# Patient Record
Sex: MALE | Race: WHITE | NOT HISPANIC OR LATINO | Employment: UNEMPLOYED | URBAN - NONMETROPOLITAN AREA
[De-identification: names, ages, dates, MRNs, and addresses within clinical notes are randomized per-mention and may not be internally consistent; named-entity substitution may affect disease eponyms.]

---

## 2017-01-31 ENCOUNTER — HOSPITAL ENCOUNTER (EMERGENCY)
Facility: HOSPITAL | Age: 22
Discharge: HOME | End: 2017-01-31
Attending: EMERGENCY MEDICINE | Admitting: EMERGENCY MEDICINE

## 2017-01-31 ENCOUNTER — APPOINTMENT (OUTPATIENT)
Dept: GENERAL RADIOLOGY | Facility: HOSPITAL | Age: 22
End: 2017-01-31

## 2017-01-31 VITALS
DIASTOLIC BLOOD PRESSURE: 80 MMHG | HEIGHT: 67 IN | WEIGHT: 127 LBS | SYSTOLIC BLOOD PRESSURE: 131 MMHG | TEMPERATURE: 98.2 F | BODY MASS INDEX: 19.93 KG/M2 | RESPIRATION RATE: 16 BRPM | OXYGEN SATURATION: 99 % | HEART RATE: 88 BPM

## 2017-01-31 DIAGNOSIS — S01.81XA LACERATION OF FACE, INITIAL ENCOUNTER: Primary | ICD-10-CM

## 2017-01-31 PROCEDURE — 73120 X-RAY EXAM OF HAND: CPT

## 2017-01-31 PROCEDURE — 99283 EMERGENCY DEPT VISIT LOW MDM: CPT

## 2017-01-31 PROCEDURE — 73120 X-RAY EXAM OF HAND: CPT | Performed by: RADIOLOGY

## 2017-01-31 RX ORDER — LIDOCAINE HYDROCHLORIDE 10 MG/ML
20 INJECTION, SOLUTION EPIDURAL; INFILTRATION; INTRACAUDAL; PERINEURAL ONCE
Status: COMPLETED | OUTPATIENT
Start: 2017-01-31 | End: 2017-01-31

## 2017-01-31 RX ORDER — LIDOCAINE HYDROCHLORIDE 10 MG/ML
INJECTION, SOLUTION INFILTRATION; PERINEURAL
Status: COMPLETED
Start: 2017-01-31 | End: 2017-01-31

## 2017-01-31 RX ADMIN — LIDOCAINE HYDROCHLORIDE 20 ML: 10 INJECTION, SOLUTION EPIDURAL; INFILTRATION; INTRACAUDAL; PERINEURAL at 21:59

## 2017-01-31 RX ADMIN — LIDOCAINE HYDROCHLORIDE 20 ML: 10 INJECTION, SOLUTION INFILTRATION; PERINEURAL at 21:59

## 2017-02-01 NOTE — ED PROVIDER NOTES
Subjective   Patient is a 21 y.o. male presenting with fall.   History provided by:  Patient  Fall   Injury location:  Head/neck and shoulder/arm  Shoulder/arm injury location:  R hand  Incident location:  [facility] facility (fell out of bunk at care home)  Time since incident:  2 hours  Arrived directly from scene: yes    Prior to arrival data:     Blood loss:  Minimal  Associated symptoms: no abdominal pain and no chest pain        Review of Systems   Constitutional: Negative.  Negative for fever.   HENT: Negative.    Respiratory: Negative.    Cardiovascular: Negative.  Negative for chest pain.   Gastrointestinal: Negative.  Negative for abdominal pain.   Endocrine: Negative.    Genitourinary: Negative.  Negative for dysuria.   Skin: Negative.         Laceration right eyebrow   Neurological: Negative.    Psychiatric/Behavioral: Negative.    All other systems reviewed and are negative.      Past Medical History   Diagnosis Date   • Heart murmur    • Heart palpitations    • Hepatitis C carrier        No Known Allergies    Past Surgical History   Procedure Laterality Date   • Tracheal surgery         Family History   Problem Relation Age of Onset   • Drug abuse Maternal Aunt    • Drug abuse Paternal Aunt    • Drug abuse Maternal Uncle    • Drug abuse Paternal Uncle    • ADD / ADHD Neg Hx    • Alcohol abuse Neg Hx    • Anxiety disorder Neg Hx    • Bipolar disorder Neg Hx    • Dementia Neg Hx    • Depression Neg Hx    • OCD Neg Hx    • Paranoid behavior Neg Hx    • Schizophrenia Neg Hx    • Seizures Neg Hx    • Self-Injurious Behavior  Neg Hx    • Suicide Attempts Neg Hx        Social History     Social History   • Marital status: Single     Spouse name: N/A   • Number of children: N/A   • Years of education: N/A     Social History Main Topics   • Smoking status: Current Every Day Smoker     Packs/day: 1.00     Years: 10.00     Types: Cigarettes     Start date: 7/28/2006   • Smokeless tobacco: Current User     Types:  Snuff   • Alcohol use No   • Drug use: Yes     Special: Methamphetamines      Comment: suboxone   • Sexual activity: Defer     Other Topics Concern   • None     Social History Narrative           Objective   Physical Exam   Constitutional: He is oriented to person, place, and time. He appears well-developed and well-nourished. No distress.   HENT:   Head: Normocephalic and atraumatic.   Right Ear: External ear normal.   Left Ear: External ear normal.   Nose: Nose normal.   Eyes: Conjunctivae and EOM are normal. Pupils are equal, round, and reactive to light.   Neck: Normal range of motion. Neck supple. No JVD present. No tracheal deviation present.   Cardiovascular: Normal rate, regular rhythm and normal heart sounds.    No murmur heard.  Pulmonary/Chest: Effort normal and breath sounds normal. No respiratory distress. He has no wheezes.   Abdominal: Soft. Bowel sounds are normal. There is no tenderness.   Musculoskeletal: Normal range of motion. He exhibits no edema or deformity.   Pain right hand   Neurological: He is alert and oriented to person, place, and time. No cranial nerve deficit.   Skin: Skin is warm and dry. No rash noted. He is not diaphoretic. No erythema. No pallor.   Psychiatric: He has a normal mood and affect. His behavior is normal. Thought content normal.   Nursing note and vitals reviewed.      Laceration Repair  Date/Time: 1/31/2017 10:34 PM  Performed by: PRISCILA TODD.  Authorized by: PRISCILA TODD   Consent: Verbal consent obtained.  Consent given by: patient  Patient understanding: patient states understanding of the procedure being performed  Patient consent: the patient's understanding of the procedure matches consent given  Procedure consent: procedure consent matches procedure scheduled  Relevant documents: relevant documents present and verified  Test results: test results available and properly labeled  Patient identity confirmed: verbally with patient  Body area:  head/neck  Location details: right eyebrow  Laceration length: 2.5 cm  Tendon involvement: none  Nerve involvement: none  Vascular damage: no  Anesthesia: local infiltration    Anesthesia:  Anesthesia: local infiltration  Local Anesthetic: lidocaine 1% without epinephrine   Anesthetic total: 3 mL  Sedation:  Patient sedated: no    Irrigation solution: saline  Skin closure: 3-0 nylon  Number of sutures: 3  Technique: simple  Approximation: loose  Approximation difficulty: simple               ED Course  ED Course                  MDM    Final diagnoses:   None            Jef Dunaway MD  01/31/17 6281

## 2022-12-16 ENCOUNTER — APPOINTMENT (OUTPATIENT)
Dept: GENERAL RADIOLOGY | Facility: HOSPITAL | Age: 27
End: 2022-12-16

## 2022-12-16 ENCOUNTER — HOSPITAL ENCOUNTER (EMERGENCY)
Facility: HOSPITAL | Age: 27
Discharge: HOME | End: 2022-12-16
Attending: EMERGENCY MEDICINE | Admitting: EMERGENCY MEDICINE

## 2022-12-16 VITALS
OXYGEN SATURATION: 99 % | WEIGHT: 150 LBS | HEART RATE: 93 BPM | HEIGHT: 67 IN | SYSTOLIC BLOOD PRESSURE: 117 MMHG | TEMPERATURE: 98.1 F | DIASTOLIC BLOOD PRESSURE: 82 MMHG | RESPIRATION RATE: 20 BRPM | BODY MASS INDEX: 23.54 KG/M2

## 2022-12-16 DIAGNOSIS — R44.0 AUDITORY HALLUCINATIONS: ICD-10-CM

## 2022-12-16 DIAGNOSIS — F29 PSYCHOSIS, UNSPECIFIED PSYCHOSIS TYPE: ICD-10-CM

## 2022-12-16 DIAGNOSIS — F23 ACUTE PSYCHOSIS: Primary | ICD-10-CM

## 2022-12-16 LAB
ALBUMIN SERPL-MCNC: 4.3 G/DL (ref 3.5–5.2)
ALBUMIN/GLOB SERPL: 1.2 G/DL
ALP SERPL-CCNC: 58 U/L (ref 39–117)
ALT SERPL W P-5'-P-CCNC: 92 U/L (ref 1–41)
AMPHET+METHAMPHET UR QL: NEGATIVE
AMPHETAMINES UR QL: NEGATIVE
ANION GAP SERPL CALCULATED.3IONS-SCNC: 11 MMOL/L (ref 5–15)
AST SERPL-CCNC: 53 U/L (ref 1–40)
BARBITURATES UR QL SCN: NEGATIVE
BASOPHILS # BLD AUTO: 0.05 10*3/MM3 (ref 0–0.2)
BASOPHILS NFR BLD AUTO: 0.9 % (ref 0–1.5)
BENZODIAZ UR QL SCN: NEGATIVE
BILIRUB SERPL-MCNC: 0.3 MG/DL (ref 0–1.2)
BUN SERPL-MCNC: 13 MG/DL (ref 6–20)
BUN/CREAT SERPL: 16.5 (ref 7–25)
BUPRENORPHINE SERPL-MCNC: POSITIVE NG/ML
CALCIUM SPEC-SCNC: 9.4 MG/DL (ref 8.6–10.5)
CANNABINOIDS SERPL QL: NEGATIVE
CHLORIDE SERPL-SCNC: 104 MMOL/L (ref 98–107)
CO2 SERPL-SCNC: 23 MMOL/L (ref 22–29)
COCAINE UR QL: NEGATIVE
CREAT SERPL-MCNC: 0.79 MG/DL (ref 0.76–1.27)
DEPRECATED RDW RBC AUTO: 33.5 FL (ref 37–54)
EGFRCR SERPLBLD CKD-EPI 2021: 124.1 ML/MIN/1.73
EOSINOPHIL # BLD AUTO: 0.16 10*3/MM3 (ref 0–0.4)
EOSINOPHIL NFR BLD AUTO: 2.9 % (ref 0.3–6.2)
ERYTHROCYTE [DISTWIDTH] IN BLOOD BY AUTOMATED COUNT: 10.9 % (ref 12.3–15.4)
ETHANOL BLD-MCNC: <10 MG/DL (ref 0–10)
GLOBULIN UR ELPH-MCNC: 3.7 GM/DL
GLUCOSE SERPL-MCNC: 108 MG/DL (ref 65–99)
HCT VFR BLD AUTO: 41.5 % (ref 37.5–51)
HGB BLD-MCNC: 15 G/DL (ref 13–17.7)
HOLD SPECIMEN: NORMAL
IMM GRANULOCYTES # BLD AUTO: 0.01 10*3/MM3 (ref 0–0.05)
IMM GRANULOCYTES NFR BLD AUTO: 0.2 % (ref 0–0.5)
LYMPHOCYTES # BLD AUTO: 1.64 10*3/MM3 (ref 0.7–3.1)
LYMPHOCYTES NFR BLD AUTO: 30.1 % (ref 19.6–45.3)
MCH RBC QN AUTO: 30.2 PG (ref 26.6–33)
MCHC RBC AUTO-ENTMCNC: 36.1 G/DL (ref 31.5–35.7)
MCV RBC AUTO: 83.7 FL (ref 79–97)
METHADONE UR QL SCN: NEGATIVE
MONOCYTES # BLD AUTO: 0.45 10*3/MM3 (ref 0.1–0.9)
MONOCYTES NFR BLD AUTO: 8.3 % (ref 5–12)
NEUTROPHILS NFR BLD AUTO: 3.14 10*3/MM3 (ref 1.7–7)
NEUTROPHILS NFR BLD AUTO: 57.6 % (ref 42.7–76)
NRBC BLD AUTO-RTO: 0 /100 WBC (ref 0–0.2)
OPIATES UR QL: NEGATIVE
OXYCODONE UR QL SCN: NEGATIVE
PCP UR QL SCN: NEGATIVE
PLATELET # BLD AUTO: 185 10*3/MM3 (ref 140–450)
PMV BLD AUTO: 9.9 FL (ref 6–12)
POTASSIUM SERPL-SCNC: 4.1 MMOL/L (ref 3.5–5.2)
PROPOXYPH UR QL: NEGATIVE
PROT SERPL-MCNC: 8 G/DL (ref 6–8.5)
RBC # BLD AUTO: 4.96 10*6/MM3 (ref 4.14–5.8)
SODIUM SERPL-SCNC: 138 MMOL/L (ref 136–145)
T4 FREE SERPL-MCNC: 1.21 NG/DL (ref 0.93–1.7)
TRICYCLICS UR QL SCN: NEGATIVE
TSH SERPL DL<=0.05 MIU/L-ACNC: 1.66 UIU/ML (ref 0.27–4.2)
WBC NRBC COR # BLD: 5.45 10*3/MM3 (ref 3.4–10.8)
WHOLE BLOOD HOLD COAG: NORMAL
WHOLE BLOOD HOLD SPECIMEN: NORMAL

## 2022-12-16 PROCEDURE — 80050 GENERAL HEALTH PANEL: CPT | Performed by: PHYSICIAN ASSISTANT

## 2022-12-16 PROCEDURE — 82077 ASSAY SPEC XCP UR&BREATH IA: CPT | Performed by: EMERGENCY MEDICINE

## 2022-12-16 PROCEDURE — 84439 ASSAY OF FREE THYROXINE: CPT | Performed by: PHYSICIAN ASSISTANT

## 2022-12-16 PROCEDURE — 36415 COLL VENOUS BLD VENIPUNCTURE: CPT

## 2022-12-16 PROCEDURE — 71046 X-RAY EXAM CHEST 2 VIEWS: CPT

## 2022-12-16 PROCEDURE — 93005 ELECTROCARDIOGRAM TRACING: CPT | Performed by: PHYSICIAN ASSISTANT

## 2022-12-16 PROCEDURE — 80306 DRUG TEST PRSMV INSTRMNT: CPT | Performed by: EMERGENCY MEDICINE

## 2022-12-16 PROCEDURE — 99285 EMERGENCY DEPT VISIT HI MDM: CPT

## 2022-12-16 RX ORDER — SERTRALINE HYDROCHLORIDE 25 MG/1
50 TABLET, FILM COATED ORAL DAILY
Qty: 15 TABLET | Refills: 0 | Status: SHIPPED | OUTPATIENT
Start: 2022-12-16

## 2022-12-16 RX ORDER — BUPRENORPHINE HYDROCHLORIDE AND NALOXONE HYDROCHLORIDE DIHYDRATE 8; 2 MG/1; MG/1
2 TABLET SUBLINGUAL DAILY
COMMUNITY

## 2022-12-16 RX ORDER — OLANZAPINE 10 MG/1
10 TABLET ORAL NIGHTLY
Qty: 15 TABLET | Refills: 0 | Status: SHIPPED | OUTPATIENT
Start: 2022-12-16

## 2022-12-16 NOTE — ED PROVIDER NOTES
EMERGENCY DEPARTMENT ENCOUNTER    Pt Name: Eugenio Velasquez  MRN: [id number]  Pt :   1995  Room Number:    Date of encounter:  2022  PCP: Provider, No Known  ED Provider: Kevin Allan PA-C    Historian: Patient    HPI:  Chief Complaint: Auditory hallucinations        Context: Eugenio Velasquez is a 27 y.o. male who presents to the ED c/o worsening auditory hallucinations over the past week.  He has a history of auditory hallucinations that are persecutory in nature, no SI or HI, voices are not telling him to hurt someone or himself, patient states he just wants them to go away.  He has a history of same, and while incarcerated he was taking Zyprexa 20 mg and Zoloft 50 mg, but when he was discharged those medications stopped.  He has been out of his meds for over a year.      PAST MEDICAL HISTORY  Past Medical History:   Diagnosis Date   • Heart murmur    • Heart palpitations    • Hepatitis C carrier (HCC)          PAST SURGICAL HISTORY  Past Surgical History:   Procedure Laterality Date   • TRACHEAL SURGERY           FAMILY HISTORY  Family History   Problem Relation Age of Onset   • Drug abuse Maternal Aunt    • Drug abuse Paternal Aunt    • Drug abuse Maternal Uncle    • Drug abuse Paternal Uncle    • ADD / ADHD Neg Hx    • Alcohol abuse Neg Hx    • Anxiety disorder Neg Hx    • Bipolar disorder Neg Hx    • Dementia Neg Hx    • Depression Neg Hx    • OCD Neg Hx    • Paranoid behavior Neg Hx    • Schizophrenia Neg Hx    • Seizures Neg Hx    • Self-Injurious Behavior  Neg Hx    • Suicide Attempts Neg Hx          SOCIAL HISTORY  Social History     Socioeconomic History   • Marital status: Single   Tobacco Use   • Smoking status: Every Day     Packs/day: 1.00     Years: 10.00     Pack years: 10.00     Types: Cigarettes     Start date: 2006   • Smokeless tobacco: Current     Types: Snuff   Substance and Sexual Activity   • Alcohol use: No   • Drug use: Yes     Types: Methamphetamines      Comment: suboxone   • Sexual activity: Defer         ALLERGIES  Patient has no known allergies.        REVIEW OF SYSTEMS  Review of Systems   Constitutional: Negative for activity change, appetite change, fatigue and fever.   HENT: Negative for congestion, nosebleeds and sore throat.    Respiratory: Negative for cough, shortness of breath and wheezing.    Cardiovascular: Negative for chest pain and palpitations.   Gastrointestinal: Negative for abdominal pain, nausea and vomiting.   Genitourinary: Negative for dysuria, flank pain and hematuria.   Musculoskeletal: Negative for arthralgias, back pain, myalgias and neck pain.   Neurological: Negative for dizziness, seizures and syncope.   Psychiatric/Behavioral: Positive for hallucinations (Persecutory auditory hallucinations) and sleep disturbance. Negative for agitation, behavioral problems, confusion, decreased concentration, dysphoric mood, self-injury and suicidal ideas. The patient is not nervous/anxious and is not hyperactive.           All systems reviewed and negative except for those discussed in HPI.       PHYSICAL EXAM    I have reviewed the triage vital signs and nursing notes.    ED Triage Vitals [12/16/22 1303]   Temp Heart Rate Resp BP SpO2   98.1 °F (36.7 °C) 106 20 122/86 97 %      Temp src Heart Rate Source Patient Position BP Location FiO2 (%)   Oral Monitor Sitting Left arm --       Physical Exam  GENERAL: Pleasant awake alert and oriented nontoxic-appearing afebrile hemodynamically stable, not in acute distress.  HENT: Nares patent.  EYES: No scleral icterus.  CV: Regular rhythm, regular rate.  RESPIRATORY: Normal effort.  No audible wheezes, rales or rhonchi.  ABDOMEN: Soft, nontender  MUSCULOSKELETAL: No deformities.   NEURO: Alert, moves all extremities, follows commands.  SKIN: Warm, dry, no rash visualized.  PSYCH: Affect appropriate, no flight of ideas, no loosening of associations.  Speech not pressured, no dysphoric mood noted.      LAB  RESULTS  Recent Results (from the past 24 hour(s))   Ethanol    Collection Time: 12/16/22  1:21 PM    Specimen: Blood   Result Value Ref Range    Ethanol <10 0 - 10 mg/dL   Green Top (Gel)    Collection Time: 12/16/22  1:21 PM   Result Value Ref Range    Extra Tube Hold for add-ons.    Lavender Top    Collection Time: 12/16/22  1:21 PM   Result Value Ref Range    Extra Tube hold for add-on    Gold Top - SST    Collection Time: 12/16/22  1:21 PM   Result Value Ref Range    Extra Tube Hold for add-ons.    Light Blue Top    Collection Time: 12/16/22  1:21 PM   Result Value Ref Range    Extra Tube Hold for add-ons.    Comprehensive Metabolic Panel    Collection Time: 12/16/22  1:21 PM    Specimen: Blood   Result Value Ref Range    Glucose 108 (H) 65 - 99 mg/dL    BUN 13 6 - 20 mg/dL    Creatinine 0.79 0.76 - 1.27 mg/dL    Sodium 138 136 - 145 mmol/L    Potassium 4.1 3.5 - 5.2 mmol/L    Chloride 104 98 - 107 mmol/L    CO2 23.0 22.0 - 29.0 mmol/L    Calcium 9.4 8.6 - 10.5 mg/dL    Total Protein 8.0 6.0 - 8.5 g/dL    Albumin 4.30 3.50 - 5.20 g/dL    ALT (SGPT) 92 (H) 1 - 41 U/L    AST (SGOT) 53 (H) 1 - 40 U/L    Alkaline Phosphatase 58 39 - 117 U/L    Total Bilirubin 0.3 0.0 - 1.2 mg/dL    Globulin 3.7 gm/dL    A/G Ratio 1.2 g/dL    BUN/Creatinine Ratio 16.5 7.0 - 25.0    Anion Gap 11.0 5.0 - 15.0 mmol/L    eGFR 124.1 >60.0 mL/min/1.73   TSH    Collection Time: 12/16/22  1:21 PM    Specimen: Blood   Result Value Ref Range    TSH 1.660 0.270 - 4.200 uIU/mL   T4, Free    Collection Time: 12/16/22  1:21 PM    Specimen: Blood   Result Value Ref Range    Free T4 1.21 0.93 - 1.70 ng/dL   CBC Auto Differential    Collection Time: 12/16/22  1:21 PM    Specimen: Blood   Result Value Ref Range    WBC 5.45 3.40 - 10.80 10*3/mm3    RBC 4.96 4.14 - 5.80 10*6/mm3    Hemoglobin 15.0 13.0 - 17.7 g/dL    Hematocrit 41.5 37.5 - 51.0 %    MCV 83.7 79.0 - 97.0 fL    MCH 30.2 26.6 - 33.0 pg    MCHC 36.1 (H) 31.5 - 35.7 g/dL    RDW 10.9 (L)  12.3 - 15.4 %    RDW-SD 33.5 (L) 37.0 - 54.0 fl    MPV 9.9 6.0 - 12.0 fL    Platelets 185 140 - 450 10*3/mm3    Neutrophil % 57.6 42.7 - 76.0 %    Lymphocyte % 30.1 19.6 - 45.3 %    Monocyte % 8.3 5.0 - 12.0 %    Eosinophil % 2.9 0.3 - 6.2 %    Basophil % 0.9 0.0 - 1.5 %    Immature Grans % 0.2 0.0 - 0.5 %    Neutrophils, Absolute 3.14 1.70 - 7.00 10*3/mm3    Lymphocytes, Absolute 1.64 0.70 - 3.10 10*3/mm3    Monocytes, Absolute 0.45 0.10 - 0.90 10*3/mm3    Eosinophils, Absolute 0.16 0.00 - 0.40 10*3/mm3    Basophils, Absolute 0.05 0.00 - 0.20 10*3/mm3    Immature Grans, Absolute 0.01 0.00 - 0.05 10*3/mm3    nRBC 0.0 0.0 - 0.2 /100 WBC   Urine Drug Screen - Urine, Clean Catch    Collection Time: 12/16/22  1:25 PM    Specimen: Urine, Clean Catch   Result Value Ref Range    THC, Screen, Urine Negative Negative    Phencyclidine (PCP), Urine Negative Negative    Cocaine Screen, Urine Negative Negative    Methamphetamine, Ur Negative Negative    Opiate Screen Negative Negative    Amphetamine Screen, Urine Negative Negative    Benzodiazepine Screen, Urine Negative Negative    Tricyclic Antidepressants Screen Negative Negative    Methadone Screen, Urine Negative Negative    Barbiturates Screen, Urine Negative Negative    Oxycodone Screen, Urine Negative Negative    Propoxyphene Screen Negative Negative    Buprenorphine, Screen, Urine Positive (A) Negative   ECG 12 Lead Other; medical clearance    Collection Time: 12/16/22  1:40 PM   Result Value Ref Range    QT Interval 354 ms    QTC Interval 411 ms       If labs were ordered, I independently reviewed the results.        RADIOLOGY  XR Chest 2 View    Result Date: 12/16/2022  DATE OF EXAM: 12/16/2022 1:45 PM  PROCEDURE: XR CHEST 2 VW-  INDICATIONS: Auditory hallucinations.   COMPARISON: No Comparisons Available  TECHNIQUE: Two radiologic views of the chest.  FINDINGS: The heart size is normal. The pulmonary vascular markings are normal. The lungs and pleural spaces are  clear of active disease.  The bony thorax is normal for age.      No active disease.  This report was finalized on 12/16/2022 2:00 PM by Jerardo Miramontes MD.            PROCEDURES    Procedures    ECG 12 Lead Other; medical clearance   Preliminary Result   Test Reason : Other~   Blood Pressure :   */*   mmHG   Vent. Rate :  81 BPM     Atrial Rate :  81 BPM      P-R Int : 136 ms          QRS Dur :  90 ms       QT Int : 354 ms       P-R-T Axes :  44   7  26 degrees      QTc Int : 411 ms      Normal sinus rhythm   Normal ECG   No previous ECGs available      Referred By: PERRY           Confirmed By:           MEDICATIONS GIVEN IN ER    Medications - No data to display      PROGRESS, DATA ANALYSIS, CONSULTS, AND MEDICAL DECISION MAKING    All labs have been independently reviewed by me.  All radiology studies have been reviewed by me and the radiologist dictating the report.   EKG's have been independently viewed and interpreted by me.               Behavioral health was consulted and are working on an outpatient plan for his ongoing care.  We will refill his Zyprexa at 10 mg for the next week, he should be evaluated at that time and considered for dosage adjustment at that time.  Likewise, we will start him on 25 mg of Zoloft to start out, and ramp up to his regular dosage per his next provider visit.  He was encouraged to return at anytime with any change or worsening of symptoms.  He verbalizes understanding and is agreeable to plan.      AS OF 15:16 EST VITALS:    BP - 117/82  HR - 93  TEMP - 98.1 °F (36.7 °C) (Oral)  O2 SATS - 99%                  DIAGNOSIS  Final diagnoses:   Acute psychosis (HCC)   Auditory hallucinations         DISPOSITION  DISCHARGE    Patient discharged in stable condition.    Reviewed implications of results, diagnosis, meds, responsibility to follow up, warning signs and symptoms of possible worsening, potential complications and reasons to return to ER.    Patient/Family voiced understanding  of above instructions.    Discussed plan for discharge, as there is no emergent indication for admission.  Pt/family is agreeable and understands need for follow up and possible repeat testing.  Pt/family is aware that discharge does not mean that nothing is wrong but that it indicates no emergency is currently present that requires admission and they must continue care with follow-up as given below or with a physician of their choice.     FOLLOW-UP  PATIENT CONNECTION - [place] [address]  [phone]  Call   To establish primary care provider         Medication List      Changed    OLANZapine 10 MG tablet  Commonly known as: zyPREXA  Take 1 tablet by mouth Every Night.  What changed:   · medication strength  · how much to take     sertraline 25 MG tablet  Commonly known as: ZOLOFT  Take 2 tablets by mouth Daily.  What changed: medication strength           Where to Get Your Medications      These medications were sent to [id number] - [place], [place] - [address] AT [facility] [address] - [phone] [facility] [phone] FX  [address] [address]    Phone: [phone]   · OLANZapine 10 MG tablet  · sertraline 25 MG tablet                Kevin Allan PA-C  12/16/22 5096

## 2022-12-16 NOTE — CONSULTS
"Eugenio Souzajeffry  1995    Preferred Pronouns: he/him    Time Called for Assessment: 1435    Assessment Start and End: 2983-5413    Orientation: alert and oriented to person, place, and time     Is patient agreeable to admission/treatment? N/A    Guardian Name/Contact/etc: self    Pt Lives With: [facility] (sober living)     Presenting Problems: Patient self presents to ED for auditory hallucinations. Patient was released from incarceration on December 1, 2022 without medications. During incarceration, patient reports being prescribed Zyprexa and Zoloft for the last 3-4 years. Patient states auditory hallucinations are non-commanding. Patient denies current HI/SI.     Mood: within normal limits     Current Stressors: mental health condition, no medications    Depression: 10 when I'm hearing voices.\"      Hopelessness: no    Anxiety: 10    Sleep: Poor, patient reports auditory hallucinations are loudest and occur mostly at night    Appetite: Good    Delusions: Patient presents with linear thought process    Hallucinations: Patient reports current auditory hallucinations. Patient states voices are \"critisizing, not commanding.\" Patient states voices \"are my family members. They can hear my thoughts and respond.\" Patient denies visual hallucinations.     Homicidal Ideations: Absent     Current Mental Healthcare: Patient receives MAT at [facility] in [place].    Current Psychiatric Medications:   Suboxone   During incarceration, patient reports being prescribed Zyprexa and Zoloft for the last 3-4 years. Patient states he has not had this medication since being released 12/1/22.     Hx of Psychiatric Treatment: Yes, patient reports history of 1 inpatient hospitalization at [facility].         [place]-SUICIDE SEVERITY RATING SCALE  Psychiatric Inpatient Setting - Discharge Screener    Ask questions that are bold and underlined Discharge   Ask Questions 1 and 2 YES NO   1) Wish to be Dead:   Person endorses " "thoughts about a wish to be dead or not alive anymore, or wish to fall asleep and not wake up.  While you were here in the hospital, have you wished you were dead or wished you could go to sleep and not wake up? \"When I hear the voices.\"     2) Suicidal Thoughts:   General non-specific thoughts of wanting to end one's life/die by suicide, “I've thought about killing myself” without general thoughts of ways to kill oneself/associated methods, intent, or plan.   While you were here in the hospital, have you actually had thoughts about killing yourself?   X   If YES to 2, ask questions 3, 4, 5, and 6.  If NO to 2, go directly to question 6   3) Suicidal Thoughts with Method (without Specific Plan or Intent to Act):   Person endorses thoughts of suicide and has thought of a least one method during the assessment period. This is different than a specific plan with time, place or method details worked out. “I thought about taking an overdose but I never made a specific plan as to when where or how I would actually do it….and I would never go through with it.”   Have you been thinking about how you might kill yourself?      4) Suicidal Intent (without Specific Plan):   Active suicidal thoughts of killing oneself and patient reports having some intent to act on such thoughts, as opposed to “I have the thoughts but I definitely will not do anything about them.”   Have you had these thoughts and had some intention of acting on them or do you have some intention of acting on them after you leave the hospital?      5) Suicide Intent with Specific Plan:   Thoughts of killing oneself with details of plan fully or partially worked out and person has some intent to carry it out.   Have you started to work out or worked out the details of how to kill yourself either for while you were here in the hospital or for after you leave the hospital? Do you intend to carry out this plan?        6) Suicide Behavior    While you were here in " the hospital, have you done anything, started to do anything, or prepared to do anything to end your life?    Examples: Took pills, cut yourself, tried to hang yourself, took out pills but didn't swallow any because you changed your mind or someone took them from you, collected pills, secured a means of obtaining a gun, gave away valuables, wrote a will or suicide note, etc.  X     Suicidal: Patient reports vague death wish when he experiences auditory hallucinations. Patient is denying current SI, plan and intent.     HISTORY:    Trauma/Abuse History: Patient does not disclose trauma history during assessment.     Does this require reporting: N/A    Legal History / History of Violence: Patient states he has been incarcerated for 6 years, patient does not disclose further details.     Family Hx of Mental Health/Substance Abuse: unknown     History of Inappropriate Sexual Behavior: No    Substance Use History:  Patient reports history of meth and heroin use. Patient states he has not used illicit substances in 6-7 years. Per patient, he started on suboxone 12/2/22 at [facility] to prevent him from relapsing. UDS positive for buprenorphine.     Current Medical Conditions or Biomedical Complications: Records indicate history of psychotic disorder, amphetamine dependence and opoid dependence.       DATA:   This therapist received a call from [facility] staff ARNALDO Reina, with orders from Kevin Allan PA-C, for a behavioral health consult.  The patient is agreeable to speak with the behavioral health team.  Met with patient at bedside. Patient is not under 1:1 security monitoring during assessment.  Patient is a 27 year old, single, , male residing in [place], [place]. Patient currently lives at sober living house.   Patient self presents to ED for auditory hallucinations. Patient was released from incarceration on December 1, 2022 without medications. During incarceration, patient reports being  "prescribed Zyprexa and Zoloft for the last 3-4 years. Patient reports current auditory hallucinations. Patient states voices are \"critisizing, not commanding.\" Patient states voices \"are my family members. They can hear my thoughts and respond.\" Patient denies visual hallucinations. Patient reports history of meth and heroin use. Patient states he has not used illicit substances in 6-7 years. Per patient, he started on suboxone 12/2/22 at [facility] to prevent him from relapsing. UDS positive for buprenorphine.    Safety plan of report to nearest hospital, or call police/911 if feeling unsafe, if having suicidal or homicidal thoughts, or if in emergent need of medications verbally reviewed with patient during assessment and suicide prevention/crisis hotlines verbally reviewed with patient during assessment.  Patient during assessment verbally agreed to safety plan. Patient reports to be agreeable for treatment recommendations.     ASSESSMENT:    Therapist completed CSSRS with patient for suicide risk assessment.  The results of patient’s CSSRS suggest that patient reports vague death wish when he experiences auditory hallucinations. Patient is denying current SI, plan and intent. Patient holds attention and is Cooperative with assessment.  Patient’s appearance is clean and casually dressed, appropriate.  The patient displays Appropriate psychomotor behavior. The patient's affect appears normal. The patient is observed to have normal rate, tone and rhythm of speech.   Patient observed to have Fair eye contact. The patient's displays fair insight, with fair impulse control and fair judgement.     PLAN:    At this time, therapist recommends patient establish outpatient treatment for med management. Patient is requesting refill of medication and is not interested in inpatient treatment. Patient states he relies on public transportation and needs provider in [place]. Therapist updated JANET Brown who is " agreeable to refill medications. Therapist provided resources for outpatient providers in the area.  I also discussed the availability of emergency behavioral health services 24/7 through the [facility] ER.  Assisted patient in identifying risk factors that would indicate the need for higher level of care, such as thoughts to harm self or others and/or self-harming behavior(s). Encouraged patient to call 911, crisis hotlines, or present to the nearest emergency department should symptoms worsen, or in any crisis/emergency. Patient agreeable and voiced understanding.     JHON Martínez 12/16/22

## 2022-12-16 NOTE — DISCHARGE INSTRUCTIONS
Follow-up with your behavioral health counselor as directed.  Call the patient connection number listed above to establish a primary care provider for ongoing care and medication refills.  Return to the emergency department immediately for any change or worsening of symptoms.

## 2022-12-17 LAB
QT INTERVAL: 354 MS
QTC INTERVAL: 411 MS